# Patient Record
(demographics unavailable — no encounter records)

---

## 2024-12-25 NOTE — HISTORY OF PRESENT ILLNESS
[de-identified] : Dear Dr Metzger, I saw Asim Carlin today for an initial Surgical Oncology consultation regarding the patient's diagnosis of a liver lesion. 		 I know you are familiar with the patient's history, but please allow me to review the pertinent history for our records.  As you know, Asim is a 68 y/o female PMHx of HTN, HLD, breast ca s/p R-lumpectomy w/RT in 2021 (no chemo), and Parkinson's disease with the diagnosis of a liver lesion.   12/4/24 CT ABDOMEN 4.3 x 5.0 x 4.3 cm hypo or nonenhancing mass located in segment 4A with cirrhotic changes in the liver and retraction of the adjacent hepatic capsule. This mass looks most concerning for a cholangiocarcinoma.  Bryce was accompanied by her daughter to her appointment today. She  reports ability to complete her ADLs/iADLs without assistance, however she has her daughter who lives close by and with whom she spend most of the day with for support.  She attributes her tremors to "nerves," though her daughter shares that she has been diagnosed with PD and there is a history of PD in their family. 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  Her daughter reports a history of falls, instability, and family has encouraged her to use support while walking, however Bryce has been resistant to using these.   Of note, she has been prescribed carbidopa-levodopa for PD, but she states she did not feel like herself when she trialed this medication, so she has not continued to use it.

## 2024-12-25 NOTE — ASSESSMENT
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   We reviewed this scan at  and it was suggested to proceed with an MRI and liver biopsy for definitive diagnosis. The lesion most likely could present a cholangio but a met could also still be in the differential. We will therefore repeat all tumor markers including breast and see her back in clinic after her work up is completed.   Shanique Singh MD

## 2024-12-25 NOTE — HISTORY OF PRESENT ILLNESS
[de-identified] : Dear Dr Metzger, I saw Asim Carlin today for an initial Surgical Oncology consultation regarding the patient's diagnosis of a liver lesion. 		 I know you are familiar with the patient's history, but please allow me to review the pertinent history for our records.  As you know, Asim is a 68 y/o female PMHx of HTN, HLD, breast ca s/p R-lumpectomy w/RT in 2021 (no chemo), and Parkinson's disease with the diagnosis of a liver lesion.   12/4/24 CT ABDOMEN 4.3 x 5.0 x 4.3 cm hypo or nonenhancing mass located in segment 4A with cirrhotic changes in the liver and retraction of the adjacent hepatic capsule. This mass looks most concerning for a cholangiocarcinoma.  Bryce was accompanied by her daughter to her appointment today. She  reports ability to complete her ADLs/iADLs without assistance, however she has her daughter who lives close by and with whom she spend most of the day with for support.  She attributes her tremors to "nerves," though her daughter shares that she has been diagnosed with PD and there is a history of PD in their family. 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  Her daughter reports a history of falls, instability, and family has encouraged her to use support while walking, however Bryce has been resistant to using these.   Of note, she has been prescribed carbidopa-levodopa for PD, but she states she did not feel like herself when she trialed this medication, so she has not continued to use it.

## 2024-12-25 NOTE — ASSESSMENT
[FreeTextEntry1] : Asim Carlin tis a 70 y/o female with PMHx significant for breast ca s/p R-lumpectomy w/RT in 2021 (DCIS), and Parkinson's disease with the diagnosis of a new liver lesion, concerning for malignancy.   We reviewed this scan at  and it was suggested to proceed with an MRI and liver biopsy for definitive diagnosis. The lesion most likely could present a cholangio but a met could also still be in the differential. We will therefore repeat all tumor markers including breast and see her back in clinic after her work up is completed.   Shanique Singh MD

## 2024-12-25 NOTE — HISTORY OF PRESENT ILLNESS
[de-identified] : Dear Dr Metzger, I saw Asim Carlin today for an initial Surgical Oncology consultation regarding the patient's diagnosis of a liver lesion. 		 I know you are familiar with the patient's history, but please allow me to review the pertinent history for our records.  As you know, Asim is a 70 y/o female PMHx of HTN, HLD, breast ca s/p R-lumpectomy w/RT in 2021 (no chemo), and Parkinson's disease with the diagnosis of a liver lesion.   12/4/24 CT ABDOMEN 4.3 x 5.0 x 4.3 cm hypo or nonenhancing mass located in segment 4A with cirrhotic changes in the liver and retraction of the adjacent hepatic capsule. This mass looks most concerning for a cholangiocarcinoma.  Bryce was accompanied by her daughter to her appointment today. She  reports ability to complete her ADLs/iADLs without assistance, however she has her daughter who lives close by and with whom she spend most of the day with for support.  She attributes her tremors to "nerves," though her daughter shares that she has been diagnosed with PD and there is a history of PD in their family. 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  Her daughter reports a history of falls, instability, and family has encouraged her to use support while walking, however Bryce has been resistant to using these.   Of note, she has been prescribed carbidopa-levodopa for PD, but she states she did not feel like herself when she trialed this medication, so she has not continued to use it.

## 2024-12-25 NOTE — PHYSICAL EXAM
[FreeTextEntry1] : General: No acute distress. Well nourished and well kept. Head: AT/NC Eyes: PERRL. EOMI. Pulmonary: No respiratory distress. Abdomen: Soft. NT/ND. No rebound, no guarding, no rigidity.  Psychiatric: Normal affect and mood.

## 2025-01-02 NOTE — HISTORY OF PRESENT ILLNESS
[FreeTextEntry1] : 70 y/o female PMHx of HTN, HLD, breast ca s/p R-lumpectomy w/RT in 2021 (no chemo), and Parkinson's disease with the incidental findings of a liver lesion.  12/4/24 CT ABDOMEN 4.3 x 5.0 x 4.3 cm hypo or nonenhancing mass located in segment 4A with cirrhotic changes in the liver and retraction of the adjacent hepatic capsule. This mass looks most concerning for a cholangiocarcinoma.  Case discussed in [facility] on 12/19/2024 12/4/24 MULTIPHASE CT: -Not convinced that this is a cirrhotic liver. -LESION: Has a hypovascular mass, over 5 cm, in segment 4. Has capsule, retracted, overlying mass. Stays hypovascular on all phases. Differential is cholangiocarcinoma. Hepatocellular carcinoma (HCC) is less likely. Given history of breast cancer, I want to assume this is not a treated metastasis. -Poor quality. The IV contrast is there but not the best. The arms are down so there is all this streak. Not hypervascular. -Biliary ductal dilatation, left lung? I cannot say for sure/maybe a little. -There are cysts, scattered.  12/19/2024 Blood work: Platelet 259, INR N/A, Ast/Alt/Alp 19/17/91, T.Bili 0.4, Albumin 4.1, Na 143, Cr 0.62 - GFR 96, Markers: AFP 3.4 CEA 1.5 CA 19-9 23  Recommended plan is to repeat MRI with sedation for better evaluation of the liver morphology and liver lesion and consider biopsy   Patient is reports no decompensation events in the past.  Denies hx of hospitalization due to liver disease. Denies hx of hepatitis. Denies fever, chills, nausea, vomiting, jaundice, melena, maroon stool, abd pain, abdominal distention, confusion, asterixis, or unintentional weight loss. Originally from [place].  Mother and father both had Parkinson's disease.  Aunt with Breast cancer  No other liver related family history.  No ETOH.  No other drug use.  No OTC supplements or herbal supplements.  Shx: Patient has two daughters, currently lives with the older daughter, the second daughter brings her to medical appointments. 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   Patient is prescribed carbidopa-levodopa for PD, but she states she did not feel like herself when she trialed this medication, so she has not continued to use it. Does have hx of falls, family encourage her to use walker.

## 2025-01-02 NOTE — HISTORY OF PRESENT ILLNESS
[FreeTextEntry1] : 70 y/o female PMHx of HTN, HLD, breast ca s/p R-lumpectomy w/RT in 2021 (no chemo), and Parkinson's disease with the incidental findings of a liver lesion.  12/4/24 CT ABDOMEN 4.3 x 5.0 x 4.3 cm hypo or nonenhancing mass located in segment 4A with cirrhotic changes in the liver and retraction of the adjacent hepatic capsule. This mass looks most concerning for a cholangiocarcinoma.  Case discussed in [facility] on 12/19/2024 12/4/24 MULTIPHASE CT: -Not convinced that this is a cirrhotic liver. -LESION: Has a hypovascular mass, over 5 cm, in segment 4. Has capsule, retracted, overlying mass. Stays hypovascular on all phases. Differential is cholangiocarcinoma. Hepatocellular carcinoma (HCC) is less likely. Given history of breast cancer, I want to assume this is not a treated metastasis. -Poor quality. The IV contrast is there but not the best. The arms are down so there is all this streak. Not hypervascular. -Biliary ductal dilatation, left lung? I cannot say for sure/maybe a little. -There are cysts, scattered.  12/19/2024 Blood work: Platelet 259, INR N/A, Ast/Alt/Alp 19/17/91, T.Bili 0.4, Albumin 4.1, Na 143, Cr 0.62 - GFR 96, Markers: AFP 3.4 CEA 1.5 CA 19-9 23  Recommended plan is to repeat MRI with sedation for better evaluation of the liver morphology and liver lesion and consider biopsy   Patient is reports no decompensation events in the past.  Denies hx of hospitalization due to liver disease. Denies hx of hepatitis. Denies fever, chills, nausea, vomiting, jaundice, melena, maroon stool, abd pain, abdominal distention, confusion, asterixis, or unintentional weight loss. Originally from [place].  Mother and father both had Parkinson's disease.  Aunt with Breast cancer  No other liver related family history.  No ETOH.  No other drug use.  No OTC supplements or herbal supplements.  Shx: Patient has two daughters, currently lives with the older daughter, the second daughter brings her to medical appointments. 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   Patient is prescribed carbidopa-levodopa for PD, but she states she did not feel like herself when she trialed this medication, so she has not continued to use it. Does have hx of falls, family encourage her to use walker.

## 2025-01-02 NOTE — ASSESSMENT
[FreeTextEntry1] : 70 y/o hx HTN, HLD, breast ca s/p R-lumpectomy w/RT in 2021 (no chemo), and Parkinson's disease with the incidental findings of a liver lesion.  #Liver lesion  Discussed in [facility] 12/19/2024  CT Abd done in 12/4/2024 Has a hypovascular mass, over 5 cm, in segment 4. Has capsule, retracted, overlying mass. Stays hypovascular on all phases. Differential is cholangiocarcinoma. Hepatocellular carcinoma (HCC) is less likely. Given history of breast cancer, I want to assume this is not a treated metastasis. Compatible to LR-3 if patient is cirrhotic.  Tumor markers AFP [id number] negative.  - Recommended to have better imaging MRI ABD with and without with sedation scheduled for 1/10/2025 and most likely biopsy down the road.  - Continue to trend tumor markers.  - Consider fibroscan if suspicious of cirrhotic morphology on MRI.  - Continue follow up with surgical onc Dr. Singh.   RTC in 3 months

## 2025-01-02 NOTE — ASSESSMENT
[FreeTextEntry1] : 68 y/o hx HTN, HLD, breast ca s/p R-lumpectomy w/RT in 2021 (no chemo), and Parkinson's disease with the incidental findings of a liver lesion.  #Liver lesion  Discussed in [facility] 12/19/2024  CT Abd done in 12/4/2024 Has a hypovascular mass, over 5 cm, in segment 4. Has capsule, retracted, overlying mass. Stays hypovascular on all phases. Differential is cholangiocarcinoma. Hepatocellular carcinoma (HCC) is less likely. Given history of breast cancer, I want to assume this is not a treated metastasis. Compatible to LR-3 if patient is cirrhotic.  Tumor markers AFP [id number] negative.  - Recommended to have better imaging MRI ABD with and without with sedation scheduled for 1/10/2025 and most likely biopsy down the road.  - Continue to trend tumor markers.  - Consider fibroscan if suspicious of cirrhotic morphology on MRI.  - Continue follow up with surgical onc Dr. Singh.   RTC in 3 months

## 2025-01-02 NOTE — HISTORY OF PRESENT ILLNESS
[FreeTextEntry1] : 70 y/o female PMHx of HTN, HLD, breast ca s/p R-lumpectomy w/RT in 2021 (no chemo), and Parkinson's disease with the incidental findings of a liver lesion.  12/4/24 CT ABDOMEN 4.3 x 5.0 x 4.3 cm hypo or nonenhancing mass located in segment 4A with cirrhotic changes in the liver and retraction of the adjacent hepatic capsule. This mass looks most concerning for a cholangiocarcinoma.  Case discussed in [facility] on 12/19/2024 12/4/24 MULTIPHASE CT: -Not convinced that this is a cirrhotic liver. -LESION: Has a hypovascular mass, over 5 cm, in segment 4. Has capsule, retracted, overlying mass. Stays hypovascular on all phases. Differential is cholangiocarcinoma. Hepatocellular carcinoma (HCC) is less likely. Given history of breast cancer, I want to assume this is not a treated metastasis. -Poor quality. The IV contrast is there but not the best. The arms are down so there is all this streak. Not hypervascular. -Biliary ductal dilatation, left lung? I cannot say for sure/maybe a little. -There are cysts, scattered.  12/19/2024 Blood work: Platelet 259, INR N/A, Ast/Alt/Alp 19/17/91, T.Bili 0.4, Albumin 4.1, Na 143, Cr 0.62 - GFR 96, Markers: AFP 3.4 CEA 1.5 CA 19-9 23  Recommended plan is to repeat MRI with sedation for better evaluation of the liver morphology and liver lesion and consider biopsy   Patient is reports no decompensation events in the past.  Denies hx of hospitalization due to liver disease. Denies hx of hepatitis. Denies fever, chills, nausea, vomiting, jaundice, melena, maroon stool, abd pain, abdominal distention, confusion, asterixis, or unintentional weight loss. Originally from [place].  Mother and father both had Parkinson's disease.  Aunt with Breast cancer  No other liver related family history.  No ETOH.  No other drug use.  No OTC supplements or herbal supplements.  Shx: Patient has two daughters, currently lives with the older daughter, the second daughter brings her to medical appointments. 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   Patient is prescribed carbidopa-levodopa for PD, but she states she did not feel like herself when she trialed this medication, so she has not continued to use it. Does have hx of falls, family encourage her to use walker.

## 2025-01-02 NOTE — ASSESSMENT
[FreeTextEntry1] : 70 y/o hx HTN, HLD, breast ca s/p R-lumpectomy w/RT in 2021 (no chemo), and Parkinson's disease with the incidental findings of a liver lesion.  #Liver lesion  Discussed in [facility] 12/19/2024  CT Abd done in 12/4/2024 Has a hypovascular mass, over 5 cm, in segment 4. Has capsule, retracted, overlying mass. Stays hypovascular on all phases. Differential is cholangiocarcinoma. Hepatocellular carcinoma (HCC) is less likely. Given history of breast cancer, I want to assume this is not a treated metastasis. Compatible to LR-3 if patient is cirrhotic.  Tumor markers AFP [id number] negative.  - Recommended to have better imaging MRI ABD with and without with sedation scheduled for 1/10/2025 and most likely biopsy down the road.  - Continue to trend tumor markers.  - Consider fibroscan if suspicious of cirrhotic morphology on MRI.  - Continue follow up with surgical onc Dr. Singh.   RTC in 3 months

## 2025-01-02 NOTE — ASSESSMENT
[FreeTextEntry1] : 68 y/o hx HTN, HLD, breast ca s/p R-lumpectomy w/RT in 2021 (no chemo), and Parkinson's disease with the incidental findings of a liver lesion.  #Liver lesion  Discussed in [facility] 12/19/2024  CT Abd done in 12/4/2024 Has a hypovascular mass, over 5 cm, in segment 4. Has capsule, retracted, overlying mass. Stays hypovascular on all phases. Differential is cholangiocarcinoma. Hepatocellular carcinoma (HCC) is less likely. Given history of breast cancer, I want to assume this is not a treated metastasis. Compatible to LR-3 if patient is cirrhotic.  Tumor markers AFP [id number] negative.  - Recommended to have better imaging MRI ABD with and without with sedation scheduled for 1/10/2025 and most likely biopsy down the road.  - Continue to trend tumor markers.  - Consider fibroscan if suspicious of cirrhotic morphology on MRI.  - Continue follow up with surgical onc Dr. Singh.   RTC in 3 months

## 2025-01-02 NOTE — HISTORY OF PRESENT ILLNESS
[FreeTextEntry1] : 68 y/o female PMHx of HTN, HLD, breast ca s/p R-lumpectomy w/RT in 2021 (no chemo), and Parkinson's disease with the incidental findings of a liver lesion.  12/4/24 CT ABDOMEN 4.3 x 5.0 x 4.3 cm hypo or nonenhancing mass located in segment 4A with cirrhotic changes in the liver and retraction of the adjacent hepatic capsule. This mass looks most concerning for a cholangiocarcinoma.  Case discussed in [facility] on 12/19/2024 12/4/24 MULTIPHASE CT: -Not convinced that this is a cirrhotic liver. -LESION: Has a hypovascular mass, over 5 cm, in segment 4. Has capsule, retracted, overlying mass. Stays hypovascular on all phases. Differential is cholangiocarcinoma. Hepatocellular carcinoma (HCC) is less likely. Given history of breast cancer, I want to assume this is not a treated metastasis. -Poor quality. The IV contrast is there but not the best. The arms are down so there is all this streak. Not hypervascular. -Biliary ductal dilatation, left lung? I cannot say for sure/maybe a little. -There are cysts, scattered.  12/19/2024 Blood work: Platelet 259, INR N/A, Ast/Alt/Alp 19/17/91, T.Bili 0.4, Albumin 4.1, Na 143, Cr 0.62 - GFR 96, Markers: AFP 3.4 CEA 1.5 CA 19-9 23  Recommended plan is to repeat MRI with sedation for better evaluation of the liver morphology and liver lesion and consider biopsy   Patient is reports no decompensation events in the past.  Denies hx of hospitalization due to liver disease. Denies hx of hepatitis. Denies fever, chills, nausea, vomiting, jaundice, melena, maroon stool, abd pain, abdominal distention, confusion, asterixis, or unintentional weight loss. Originally from [place].  Mother and father both had Parkinson's disease.  Aunt with Breast cancer  No other liver related family history.  No ETOH.  No other drug use.  No OTC supplements or herbal supplements.  Shx: Patient has two daughters, currently lives with the older daughter, the second daughter brings her to medical appointments. 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   Patient is prescribed carbidopa-levodopa for PD, but she states she did not feel like herself when she trialed this medication, so she has not continued to use it. Does have hx of falls, family encourage her to use walker.

## 2025-01-02 NOTE — PHYSICAL EXAM
[General Appearance - Alert] : alert [General Appearance - In No Acute Distress] : in no acute distress [Heart Rate And Rhythm] : heart rate was normal and rhythm regular [Heart Sounds] : normal S1 and S2 [Heart Sounds Gallop] : no gallops [Murmurs] : no murmurs [Heart Sounds Pericardial Friction Rub] : no pericardial rub [Bowel Sounds] : normal bowel sounds [Oriented To Time, Place, And Person] : oriented to person, place, and time [Scleral Icterus] : No Scleral Icterus [Spider Angioma] : No spider angioma(s) were observed [Abdominal  Ascites] : no ascites [Asterixis] : no asterixis observed [Jaundice] : No jaundice [Depression] : no depression [FreeTextEntry1] : Resting tremors

## 2025-01-02 NOTE — HISTORY OF PRESENT ILLNESS
[FreeTextEntry1] : 68 y/o female PMHx of HTN, HLD, breast ca s/p R-lumpectomy w/RT in 2021 (no chemo), and Parkinson's disease with the incidental findings of a liver lesion.  12/4/24 CT ABDOMEN 4.3 x 5.0 x 4.3 cm hypo or nonenhancing mass located in segment 4A with cirrhotic changes in the liver and retraction of the adjacent hepatic capsule. This mass looks most concerning for a cholangiocarcinoma.  Case discussed in [facility] on 12/19/2024 12/4/24 MULTIPHASE CT: -Not convinced that this is a cirrhotic liver. -LESION: Has a hypovascular mass, over 5 cm, in segment 4. Has capsule, retracted, overlying mass. Stays hypovascular on all phases. Differential is cholangiocarcinoma. Hepatocellular carcinoma (HCC) is less likely. Given history of breast cancer, I want to assume this is not a treated metastasis. -Poor quality. The IV contrast is there but not the best. The arms are down so there is all this streak. Not hypervascular. -Biliary ductal dilatation, left lung? I cannot say for sure/maybe a little. -There are cysts, scattered.  12/19/2024 Blood work: Platelet 259, INR N/A, Ast/Alt/Alp 19/17/91, T.Bili 0.4, Albumin 4.1, Na 143, Cr 0.62 - GFR 96, Markers: AFP 3.4 CEA 1.5 CA 19-9 23  Recommended plan is to repeat MRI with sedation for better evaluation of the liver morphology and liver lesion and consider biopsy   Patient is reports no decompensation events in the past.  Denies hx of hospitalization due to liver disease. Denies hx of hepatitis. Denies fever, chills, nausea, vomiting, jaundice, melena, maroon stool, abd pain, abdominal distention, confusion, asterixis, or unintentional weight loss. Originally from [place].  Mother and father both had Parkinson's disease.  Aunt with Breast cancer  No other liver related family history.  No ETOH.  No other drug use.  No OTC supplements or herbal supplements.  Shx: Patient has two daughters, currently lives with the older daughter, the second daughter brings her to medical appointments. 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   Patient is prescribed carbidopa-levodopa for PD, but she states she did not feel like herself when she trialed this medication, so she has not continued to use it. Does have hx of falls, family encourage her to use walker.

## 2025-01-02 NOTE — ASSESSMENT
[FreeTextEntry1] : 68 y/o hx HTN, HLD, breast ca s/p R-lumpectomy w/RT in 2021 (no chemo), and Parkinson's disease with the incidental findings of a liver lesion.  #Liver lesion  Discussed in [facility] 12/19/2024  CT Abd done in 12/4/2024 Has a hypovascular mass, over 5 cm, in segment 4. Has capsule, retracted, overlying mass. Stays hypovascular on all phases. Differential is cholangiocarcinoma. Hepatocellular carcinoma (HCC) is less likely. Given history of breast cancer, I want to assume this is not a treated metastasis. Compatible to LR-3 if patient is cirrhotic.  Tumor markers AFP [id number] negative.  - Recommended to have better imaging MRI ABD with and without with sedation scheduled for 1/10/2025 and most likely biopsy down the road.  - Continue to trend tumor markers.  - Consider fibroscan if suspicious of cirrhotic morphology on MRI.  - Continue follow up with surgical onc Dr. Singh.   RTC in 3 months

## 2025-01-16 NOTE — ASSESSMENT
[FreeTextEntry1] : Asim Carlin tis a 70 y/o female with PMHx significant for breast ca s/p R-lumpectomy w/RT in 2021 (DCIS), and Parkinson's disease with the diagnosis of a new liver lesion.   We reviewed her recent MRI which looks like a hemorrhagic cyst and less concerning for a malignancy. Given these findings and all negative tumor markers, we will present her case at our  Tumor board again to see if a  liver biopsy is still indicated. We will also see if we can get a hold of any prior imaging for comparison.   Shanique Singh MD  Surgical Oncology

## 2025-01-16 NOTE — HISTORY OF PRESENT ILLNESS
[de-identified] : Dear Dr Metzger, I saw Asim Carlin today for an initial Surgical Oncology consultation regarding the patient's diagnosis of a liver lesion. 		 I know you are familiar with the patient's history, but please allow me to review the pertinent history for our records.  As you know, sAim is a 70 y/o female PMHx of HTN, HLD, breast ca s/p R-lumpectomy w/RT in 2021 (no chemo), and Parkinson's disease with the diagnosis of a liver lesion.   12/4/24 CT ABDOMEN 4.3 x 5.0 x 4.3 cm hypo or nonenhancing mass located in segment 4A with cirrhotic changes in the liver and retraction of the adjacent hepatic capsule. This mass looks most concerning for a cholangiocarcinoma.  Bryce was accompanied by her daughter to her appointment today. She  reports ability to complete her ADLs/iADLs without assistance, however she has her daughter who lives close by and with whom she spend most of the day with for support.  She attributes her tremors to "nerves," though her daughter shares that she has been diagnosed with PD and there is a history of PD in their family. 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  Her daughter reports a history of falls, instability, and family has encouraged her to use support while walking, however Bryce has been resistant to using these.   Of note, she has been prescribed carbidopa-levodopa for PD, but she states she did not feel like herself when she trialed this medication, so she has not continued to use it.   12/19/24 CA 15.3 -11.2 CA 27.29 - 17.6 CEA 1.5 AFP 3.4 CA 19-9 23 MRI 1/10/25 showed Dominant left hepatic lobe 4.5 cm hemorrhagic cyst with capsular retraction. Recommend correlation with prior remote CT or MRI imaging of the abdomen obtained around time of initial breast cancer diagnosis to compare size. Several smaller simple and thinly septated cysts. No suspicious hepatic lesion.

## 2025-01-16 NOTE — HISTORY OF PRESENT ILLNESS
[de-identified] : Dear Dr Metzger, I saw Asim Carlin today for an initial Surgical Oncology consultation regarding the patient's diagnosis of a liver lesion. 		 I know you are familiar with the patient's history, but please allow me to review the pertinent history for our records.  As you know, Asim is a 68 y/o female PMHx of HTN, HLD, breast ca s/p R-lumpectomy w/RT in 2021 (no chemo), and Parkinson's disease with the diagnosis of a liver lesion.   12/4/24 CT ABDOMEN 4.3 x 5.0 x 4.3 cm hypo or nonenhancing mass located in segment 4A with cirrhotic changes in the liver and retraction of the adjacent hepatic capsule. This mass looks most concerning for a cholangiocarcinoma.  Bryce was accompanied by her daughter to her appointment today. She  reports ability to complete her ADLs/iADLs without assistance, however she has her daughter who lives close by and with whom she spend most of the day with for support.  She attributes her tremors to "nerves," though her daughter shares that she has been diagnosed with PD and there is a history of PD in their family. 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  Her daughter reports a history of falls, instability, and family has encouraged her to use support while walking, however Bryce has been resistant to using these.   Of note, she has been prescribed carbidopa-levodopa for PD, but she states she did not feel like herself when she trialed this medication, so she has not continued to use it.   12/19/24 CA 15.3 -11.2 CA 27.29 - 17.6 CEA 1.5 AFP 3.4 CA 19-9 23 MRI 1/10/25 showed Dominant left hepatic lobe 4.5 cm hemorrhagic cyst with capsular retraction. Recommend correlation with prior remote CT or MRI imaging of the abdomen obtained around time of initial breast cancer diagnosis to compare size. Several smaller simple and thinly septated cysts. No suspicious hepatic lesion.

## 2025-01-16 NOTE — ASSESSMENT
[FreeTextEntry1] : Asim Carlin tis a 68 y/o female with PMHx significant for breast ca s/p R-lumpectomy w/RT in 2021 (DCIS), and Parkinson's disease with the diagnosis of a new liver lesion.   We reviewed her recent MRI which looks like a hemorrhagic cyst and less concerning for a malignancy. Given these findings and all negative tumor markers, we will present her case at our  Tumor board again to see if a  liver biopsy is still indicated. We will also see if we can get a hold of any prior imaging for comparison.   Shanique Singh MD  Surgical Oncology

## 2025-01-16 NOTE — HISTORY OF PRESENT ILLNESS
[de-identified] : Dear Dr Metzger, I saw Asim Carlin today for an initial Surgical Oncology consultation regarding the patient's diagnosis of a liver lesion. 		 I know you are familiar with the patient's history, but please allow me to review the pertinent history for our records.  As you know, Asim is a 70 y/o female PMHx of HTN, HLD, breast ca s/p R-lumpectomy w/RT in 2021 (no chemo), and Parkinson's disease with the diagnosis of a liver lesion.   12/4/24 CT ABDOMEN 4.3 x 5.0 x 4.3 cm hypo or nonenhancing mass located in segment 4A with cirrhotic changes in the liver and retraction of the adjacent hepatic capsule. This mass looks most concerning for a cholangiocarcinoma.  Bryce was accompanied by her daughter to her appointment today. She  reports ability to complete her ADLs/iADLs without assistance, however she has her daughter who lives close by and with whom she spend most of the day with for support.  She attributes her tremors to "nerves," though her daughter shares that she has been diagnosed with PD and there is a history of PD in their family. 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  Her daughter reports a history of falls, instability, and family has encouraged her to use support while walking, however Bryce has been resistant to using these.   Of note, she has been prescribed carbidopa-levodopa for PD, but she states she did not feel like herself when she trialed this medication, so she has not continued to use it.   12/19/24 CA 15.3 -11.2 CA 27.29 - 17.6 CEA 1.5 AFP 3.4 CA 19-9 23 MRI 1/10/25 showed Dominant left hepatic lobe 4.5 cm hemorrhagic cyst with capsular retraction. Recommend correlation with prior remote CT or MRI imaging of the abdomen obtained around time of initial breast cancer diagnosis to compare size. Several smaller simple and thinly septated cysts. No suspicious hepatic lesion.

## 2025-01-22 NOTE — HISTORY OF PRESENT ILLNESS
[de-identified] : This telephonic visit was provided via real-time 2 way audio visual technology. The patient, BRYCE CARLIN, was located at home, -[address] [place] [address], at the time of the visit. The provider, was located at the clinic at [address] [place] [address] at the time of the visit. The patient, BRYCE CARLIN and provider participated in the telehealth encounter. Verbal consent was given on 01/23/2025 by the patient.  Dear Dr Metzger, I saw Asim Carlin today for afollow up visit regarding the patient's diagnosis of a liver lesion. 		 I know you are familiar with the patient's history, but please allow me to review the pertinent history for our records.  As you know, Asim is a 68 y/o female PMHx of HTN, HLD, breast ca s/p R-lumpectomy w/RT in 2021 (no chemo), and Parkinson's disease with the diagnosis of a liver lesion.   12/4/24 CT ABDOMEN 4.3 x 5.0 x 4.3 cm hypo or nonenhancing mass located in segment 4A with cirrhotic changes in the liver and retraction of the adjacent hepatic capsule. This mass looks most concerning for a cholangiocarcinoma.  Bryce was accompanied by her daughter to her appointment today. She  reports ability to complete her ADLs/iADLs without assistance, however she has her daughter who lives close by and with whom she spend most of the day with for support.  She attributes her tremors to "nerves," though her daughter shares that she has been diagnosed with PD and there is a history of PD in their family. 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  Her daughter reports a history of falls, instability, and family has encouraged her to use support while walking, however Bryce has been resistant to using these.   Of note, she has been prescribed carbidopa-levodopa for PD, but she states she did not feel like herself when she trialed this medication, so she has not continued to use it.   12/19/24 CA 15.3 -11.2 CA 27.29 - 17.6 CEA 1.5 AFP 3.4 CA 19-9 23 MRI 1/10/25 showed Dominant left hepatic lobe 4.5 cm hemorrhagic cyst with capsular retraction. Recommend correlation with prior remote CT or MRI imaging of the abdomen obtained around time of initial breast cancer diagnosis to compare size. Several smaller simple and thinly septated cysts. No suspicious hepatic lesion.

## 2025-01-22 NOTE — ASSESSMENT
[FreeTextEntry1] : Asim Calrin tis a 68 y/o female with PMHx significant for breast ca s/p R-lumpectomy w/RT in 2021 (DCIS), and Parkinson's disease with the diagnosis of a new liver lesion.   We reviewed her recent MRI which looks like a hemorrhagic cyst and less concerning for a malignancy. Given these findings and all negative tumor markers, we will present her case at our  Tumor board again to see if a  liver biopsy is still indicated. We will also see if we can get a hold of any prior imaging for comparison.   Shanique Singh MD  Surgical Oncology

## 2025-01-23 NOTE — ASSESSMENT
[FreeTextEntry1] : Asim Carlin tis a 68 y/o female with PMHx significant for breast ca s/p R-lumpectomy w/RT in 2021 (DCIS), and Parkinson's disease with the diagnosis of a new liver lesion.  We reviewed her recent MRI which looks like a hemorrhagic cyst and less concerning for a malignancy. Given these findings and all negative tumor markers, our  Tumor board recommends that's to follow this cyst with US in 6 months. We discussed the option of repeat CT or MRI but given her Parkinson's she will require sedation and that seems too much of a burden for the patient. She prefers the US and is scheduled to see me after in 6 months. ' I also reminded her to see her PCP regarding her reflux symptoms and suggested a PPI. She will follow up with her PCP.   Shanique Singh MD  Surgical Oncology

## 2025-01-23 NOTE — HISTORY OF PRESENT ILLNESS
[de-identified] : This telephonic visit was provided via real-time 2 way audio visual technology. The patient, BRYCE CARLIN, was located at home, -[address] [place] [address], at the time of the visit. The provider, was located at the clinic at [address] [place] [address] at the time of the visit. The patient, BRYCE CARLIN and provider participated in the telehealth encounter. Verbal consent was given on 01/23/2025 by the patient.  Dear Dr Metzger, I saw Asim Carlin today for afollow up visit regarding the patient's diagnosis of a liver lesion. 		 I know you are familiar with the patient's history, but please allow me to review the pertinent history for our records.  As you know, Asim is a 70 y/o female PMHx of HTN, HLD, breast ca s/p R-lumpectomy w/RT in 2021 (no chemo), and Parkinson's disease with the diagnosis of a liver lesion.   12/4/24 CT ABDOMEN 4.3 x 5.0 x 4.3 cm hypo or nonenhancing mass located in segment 4A with cirrhotic changes in the liver and retraction of the adjacent hepatic capsule. This mass looks most concerning for a cholangiocarcinoma.  Bryce was accompanied by her daughter to her appointment today. She  reports ability to complete her ADLs/iADLs without assistance, however she has her daughter who lives close by and with whom she spend most of the day with for support.  She attributes her tremors to "nerves," though her daughter shares that she has been diagnosed with PD and there is a history of PD in their family. 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  Her daughter reports a history of falls, instability, and family has encouraged her to use support while walking, however Bryce has been resistant to using these.   Of note, she has been prescribed carbidopa-levodopa for PD, but she states she did not feel like herself when she trialed this medication, so she has not continued to use it.   12/19/24 CA 15.3 -11.2 CA 27.29 - 17.6 CEA 1.5 AFP 3.4 CA 19-9 23 MRI 1/10/25 showed Dominant left hepatic lobe 4.5 cm hemorrhagic cyst with capsular retraction. No suspicious hepatic lesion.   Today she states she feels well but has some symptoms consistent with reflux which improve with tums.

## 2025-02-12 NOTE — PHYSICAL EXAM
[Person] : oriented to person [Place] : oriented to place [Time] : oriented to time [Cranial Nerves Oculomotor (III)] : extraocular motion intact [FreeTextEntry1] : There is mild masking. Speech is nl. EOMI.  There are moderate rest and postural hand tremors L>R. There is 1+ LUE KT.  There is moderate L>R bradykinesia, 3+ Leg lifts. Unable to do left foot taps. Left foot is inverted. Stands with assistance. Shuffles with stooped posture. Scissoring like gait. Does not recover from pull test

## 2025-02-12 NOTE — DISCUSSION/SUMMARY
[FreeTextEntry1] : 69F with PD x 7 years with increased bradykinesia and gait disorder. Not able to tolerate sinemet. Reported visual hallucinations of snakes and insects in her home, currently manageable.  Patient was counseled on the following recommendations: -trial of amantadine qd. AEs discussed, advised to stop immediately and contact me if there are worsening hallucinations or changes in cognition.  -start melatonin 3mg hs for insomnia. May need quetiapine in the future for VH -Continue PT  -Reordered repeat MRI L/spine w contrast to assess L2 lesion  f/u 3-4 months

## 2025-02-12 NOTE — HISTORY OF PRESENT ILLNESS
[FreeTextEntry1] : 69F who is seeing me for evaluation of Parkinson's disease. She was diagnosed with PD by me in 2019. I started her l-dopa at that time and she did not followup. Her hand tremors started in 2017 and remains independent to all ADLs. Her gait has slowed over time and had 1 fall 2 weeks ago from her bed. She attributes it to using a new matress. She fractured a rib as a result. She creams in her sleep. Denies sleep fragmentation. The hand tremors bother her at night and when she is anxious. Unclear if she had a response to LD.   Patient does not feel that she has PD and concerned about medication side effect. She ahd numbness in her left foot with EMG demonstrating neuropathy. Her toes curl often. Patient experienced diffuse body pains with an unremarkable neuropathy workup which responded to duloxetine. The onset of her pain was associated with significant stressors. She lives on her own. Denies any hallucinations.   2/10/25 Patient reportedly tried Sinemet 1 time and it caused chest pain. She immediately stopped it. She never tried melatonin. She continues to walk. Did a assessment but has not started therapy. Reportedly fell 2 weeks ago as she was coming down some stairs. Independent with all ADLs despite tremors. States she sleeps well. No dysphagia. Denies RBD, has not fallen OOB again. Denies constipation. Daughter states she is imagining insect in her shoes. Patient vividly describes insects biting her toes at night. She also sees snakes in her house but is not bothered by them.  Did not have lumbar MRI. Scheduled for repeat EMG later this week.    PMH: HTN, HLD, Breast CA s/p R lumpectomy 3/2021 and radiation (no chemo) Meds: cymbalta, lipitor, toprol, asa  Labs 11/2023 significant for B12 228, preDM (5.8%), STUART 1:160 homo, . remaining labs WNL (CRP, JOHN, hep panel, gang ab panel, Zinc, copper, NMO, MOG, HTLV, SS, Cryoglobulin, B6, syphilis, ACE, Lyme, spep/immunfixation, DsDNA)  Imaging:  MR brain w/w/o contrast 2/15/2024- no acute pathology. Mild-mod microvascular ischemic changes. Multiple foci of susceptibility (mostly cortical)-microhemorrhages vs CAA? MR C spine w/w/o 2/15/2024- no cord lesions. multilevel degenerative changes, mild-mod spinal canal stenosis at C3/4 and C4/5. Mild-mod multilevel neural foraminal stenosis. MR L/sspine: Vague area of T1 prolongation is seen involving the L2 vertebral body (102-9) this could be compatible with an atypical hemangioma though possibly of underlying lesion such as metastasis or other similar etiology cannot be totally excluded. DJD  EMG/NCS 3/18/2024-Decrease amplitude of b/l peroneal and tibial nerves without impact on conduction velocity. Axonal neuropathy.

## 2025-04-25 NOTE — PHYSICAL EXAM
[General Appearance - Alert] : alert [Oriented To Time, Place, And Person] : oriented to person, place, and time [Cranial Nerves Optic (II)] : visual acuity intact bilaterally,  visual fields full to confrontation, pupils equal round and reactive to light [Cranial Nerves Oculomotor (III)] : extraocular motion intact [Cranial Nerves Trigeminal (V)] : facial sensation intact symmetrically [Cranial Nerves Facial (VII)] : face symmetrical [Cranial Nerves Vestibulocochlear (VIII)] : hearing was intact bilaterally [Cranial Nerves Accessory (XI - Cranial And Spinal)] : head turning and shoulder shrug symmetric [Cranial Nerves Hypoglossal (XII)] : there was no tongue deviation with protrusion [FreeTextEntry1] : +1 Facial hypomimia, reduced blink rate Vertical eye movements intact without square wave jerks normal speech volume +2 Rigidity of neck rotation +2 Rigidity of limbs present with contralateral activation  +2 BUE Resting tremor +1 Action tremor +1 Postural tremor  +2 Bradykinesia with finger tapping, hand supination/pronation, foot tapping, foot stomping. No dysmetria with finger to nose pushes to stand +2 Flexed posture slow gait initiation, decreased stride length, reduced arm swing, with activation of tremor, no freezing of gait upon turning, requires multiple steps with turning. does not recover from pull test

## 2025-04-25 NOTE — HISTORY OF PRESENT ILLNESS
[FreeTextEntry1] : 69-year-old female presents for follow up. Patient was recently hospitalized for paranoia and exacerbation of hallucinations. EMS had to be called to assist because she believed there was a man in her apartment. Has been taking Seroquel 25mg x 1 week which has helped her sleep. Has been giving family a hard time with taking medications. Taking less Sinemet than was instructed which resulted in more tremor. States it makes her nauseous   Gait was much better since leaving rehab. Now does not need her cane as much as before.  Receiving PT at home still  nonmotor Sleeping well since starting Seroquel +VH  Denies issues with swallowing  no constipation  Denies UI    current medication  gabapentin 300mg 2 caps QID  C/L 25/100 2 tab BID Duloxetine 60mg qd C/L 50/200 1 tab at bedtime

## 2025-04-25 NOTE — DISCUSSION/SUMMARY
[FreeTextEntry1] : 69F with PD x 7 years with worsening paranoia and VH which briefly improved while she was in rehab.  Gait improved with current regimen. Tremors worsened when she decreased sinemet on her own due to side effects to L-dopa  Patient was counseled on the following recommendations: -Continue seroquel 25mg qhs  -Spread daytime L-dopa to 1 tab QID plus lodosyn for nausea -Continue sinemet 50/200 qhs  -Continue gabapentin and duloxetine -Continue PT   f/u 3 months

## 2025-07-16 NOTE — HISTORY OF PRESENT ILLNESS
[FreeTextEntry1] : 70 y/o female PMHx of HTN, HLD, breast ca s/p R-lumpectomy w/RT in 2021 (no chemo), and Parkinson's disease with the incidental findings of a liver lesion.  12/4/24 CT ABDOMEN 4.3 x 5.0 x 4.3 cm hypo or nonenhancing mass located in segment 4A with cirrhotic changes in the liver and retraction of the adjacent hepatic capsule. This mass looks most concerning for a cholangiocarcinoma.  12/4/24 MULTIPHASE CT: -Not convinced that this is a cirrhotic liver. -LESION: Has a hypovascular mass, over 5 cm, in segment 4. Has capsule, retracted, overlying mass. Stays hypovascular on all phases. Differential is cholangiocarcinoma. Hepatocellular carcinoma (HCC) is less likely. Given history of breast cancer, I want to assume this is not a treated metastasis. -Poor quality. The IV contrast is there but not the best. The arms are down so there is all this streak. Not hypervascular. -Biliary ductal dilatation, left lung? I cannot say for sure/maybe a little. -There are cysts, scattered.  12/19/2024 Blood work: Platelet 259, INR N/A, Ast/Alt/Alp 19/17/91, T.Bili 0.4, Albumin 4.1, Na 143, Cr 0.62 - GFR 96, Markers: AFP 3.4 CEA 1.5 CA 19-9 23  Recommended plan was to repeat MRI with sedation for better evaluation of the liver morphology and liver lesion and consider biopsy  Patient is reports no decompensation events in the past. Denies hx of hospitalization due to liver disease. Denies hx of hepatitis. Denies fever, chills, nausea, vomiting, jaundice, melena, maroon stool, abd pain, abdominal distention, confusion, asterixis, or unintentional weight loss. Originally from [place]. Mother and father both had Parkinson's disease. Aunt with Breast cancer No other liver related family history. No ETOH. No other drug use. No OTC supplements or herbal supplements. Shx: Patient has two daughters, currently lives with the older daughter, the second daughter brings her to medical appointments. 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  Patient is prescribed carbidopa-levodopa for PD, but she states she did not feel like herself when she trialed this medication, so she has not continued to use it. Does have hx of falls, family encourage her to use walker.   Patient has never had colonoscopy. She presents for OV alone but very unsure for reason of visit. She is unaware that she was told about a liver lesion or cirrhosis. Bowel movements appear to be daily and regular without difficulty or strain. She denies any family hx of colon cancer. No complaints of rectal bleeding, blood in the stool, melena, or hematemesis.

## 2025-07-16 NOTE — PHYSICAL EXAM
[Alert] : alert [Normal Voice/Communication] : normal voice/communication [Healthy Appearing] : healthy appearing [No Acute Distress] : no acute distress [Sclera] : the sclera and conjunctiva were normal [Hearing Threshold Finger Rub Not [facility]] : hearing was normal [Normal Lips/Gums] : the lips and gums were normal [Oropharynx] : the oropharynx was normal [Normal Appearance] : the appearance of the neck was normal [No Neck Mass] : no neck mass was observed [No Respiratory Distress] : no respiratory distress [No Acc Muscle Use] : no accessory muscle use [Respiration, Rhythm And Depth] : normal respiratory rhythm and effort [Auscultation Breath Sounds / Voice Sounds] : lungs were clear to auscultation bilaterally [Heart Rate And Rhythm] : heart rate was normal and rhythm regular [Normal S1, S2] : normal S1 and S2 [Murmurs] : no murmurs [Bowel Sounds] : normal bowel sounds [Abdomen Tenderness] : non-tender [No Masses] : no abdominal mass palpated [Abdomen Soft] : soft [] : no hepatosplenomegaly [Oriented To Time, Place, And Person] : oriented to person, place, and time

## 2025-07-16 NOTE — ASSESSMENT
[FreeTextEntry1] : Patient needs to be seen by Dr Ruiz and will need him to clear her to have colonoscopy as per Dr Brunner prior to being scheduled for screening colonoscopy.   Patient will f/u with Dr Brunner after further work up and evaluation with Dr Ruiz.   Patient verbalized understanding of all information provided. All questions answered and reviewed.  I spent 45 minutes with the patient as well as reviewing documents prior to and after the office visit